# Patient Record
Sex: FEMALE | Race: WHITE
[De-identification: names, ages, dates, MRNs, and addresses within clinical notes are randomized per-mention and may not be internally consistent; named-entity substitution may affect disease eponyms.]

---

## 2018-04-04 ENCOUNTER — HOSPITAL ENCOUNTER (EMERGENCY)
Dept: HOSPITAL 72 - EMR | Age: 78
Discharge: HOME | End: 2018-04-04
Payer: MEDICARE

## 2018-04-04 VITALS — DIASTOLIC BLOOD PRESSURE: 80 MMHG | SYSTOLIC BLOOD PRESSURE: 122 MMHG

## 2018-04-04 VITALS — DIASTOLIC BLOOD PRESSURE: 77 MMHG | SYSTOLIC BLOOD PRESSURE: 138 MMHG

## 2018-04-04 VITALS — WEIGHT: 170 LBS | HEIGHT: 60 IN | BODY MASS INDEX: 33.38 KG/M2

## 2018-04-04 DIAGNOSIS — J45.909: ICD-10-CM

## 2018-04-04 DIAGNOSIS — E11.9: ICD-10-CM

## 2018-04-04 DIAGNOSIS — J11.1: Primary | ICD-10-CM

## 2018-04-04 DIAGNOSIS — K21.9: ICD-10-CM

## 2018-04-04 LAB
ADD MANUAL DIFF: NO
ALBUMIN SERPL-MCNC: 3.6 G/DL (ref 3.4–5)
ALBUMIN/GLOB SERPL: 0.9 {RATIO} (ref 1–2.7)
ALP SERPL-CCNC: 157 U/L (ref 46–116)
ALT SERPL-CCNC: 40 U/L (ref 12–78)
ANION GAP SERPL CALC-SCNC: 5 MMOL/L (ref 5–15)
APPEARANCE UR: CLEAR
APTT PPP: YELLOW S
AST SERPL-CCNC: 22 U/L (ref 15–37)
BASOPHILS NFR BLD AUTO: 0.9 % (ref 0–2)
BILIRUB SERPL-MCNC: 0.9 MG/DL (ref 0.2–1)
BUN SERPL-MCNC: 14 MG/DL (ref 7–18)
CALCIUM SERPL-MCNC: 9.3 MG/DL (ref 8.5–10.1)
CHLORIDE SERPL-SCNC: 103 MMOL/L (ref 98–107)
CO2 SERPL-SCNC: 30 MMOL/L (ref 21–32)
CREAT SERPL-MCNC: 1 MG/DL (ref 0.55–1.3)
EOSINOPHIL NFR BLD AUTO: 2.3 % (ref 0–3)
ERYTHROCYTE [DISTWIDTH] IN BLOOD BY AUTOMATED COUNT: 12.9 % (ref 11.6–14.8)
GLOBULIN SER-MCNC: 4 G/DL
GLUCOSE UR STRIP-MCNC: NEGATIVE MG/DL
HCT VFR BLD CALC: 55.4 % (ref 37–47)
HGB BLD-MCNC: 17.6 G/DL (ref 12–16)
KETONES UR QL STRIP: NEGATIVE
LEUKOCYTE ESTERASE UR QL STRIP: (no result)
LYMPHOCYTES NFR BLD AUTO: 15.6 % (ref 20–45)
MCV RBC AUTO: 93 FL (ref 80–99)
MONOCYTES NFR BLD AUTO: 6.9 % (ref 1–10)
NEUTROPHILS NFR BLD AUTO: 74.2 % (ref 45–75)
NITRITE UR QL STRIP: NEGATIVE
PH UR STRIP: 5 [PH] (ref 4.5–8)
PLATELET # BLD: 216 K/UL (ref 150–450)
POTASSIUM SERPL-SCNC: 4.3 MMOL/L (ref 3.5–5.1)
PROT UR QL STRIP: (no result)
RBC # BLD AUTO: 5.94 M/UL (ref 4.2–5.4)
SODIUM SERPL-SCNC: 138 MMOL/L (ref 136–145)
SP GR UR STRIP: 1.02 (ref 1–1.03)
UROBILINOGEN UR-MCNC: 1 MG/DL (ref 0–1)
WBC # BLD AUTO: 10 K/UL (ref 4.8–10.8)

## 2018-04-04 PROCEDURE — 83605 ASSAY OF LACTIC ACID: CPT

## 2018-04-04 PROCEDURE — 84484 ASSAY OF TROPONIN QUANT: CPT

## 2018-04-04 PROCEDURE — 87040 BLOOD CULTURE FOR BACTERIA: CPT

## 2018-04-04 PROCEDURE — 83880 ASSAY OF NATRIURETIC PEPTIDE: CPT

## 2018-04-04 PROCEDURE — 94664 DEMO&/EVAL PT USE INHALER: CPT

## 2018-04-04 PROCEDURE — 71045 X-RAY EXAM CHEST 1 VIEW: CPT

## 2018-04-04 PROCEDURE — 80053 COMPREHEN METABOLIC PANEL: CPT

## 2018-04-04 PROCEDURE — 96361 HYDRATE IV INFUSION ADD-ON: CPT

## 2018-04-04 PROCEDURE — 99284 EMERGENCY DEPT VISIT MOD MDM: CPT

## 2018-04-04 PROCEDURE — 85025 COMPLETE CBC W/AUTO DIFF WBC: CPT

## 2018-04-04 PROCEDURE — 93005 ELECTROCARDIOGRAM TRACING: CPT

## 2018-04-04 PROCEDURE — 86710 INFLUENZA VIRUS ANTIBODY: CPT

## 2018-04-04 PROCEDURE — 96374 THER/PROPH/DIAG INJ IV PUSH: CPT

## 2018-04-04 PROCEDURE — 36415 COLL VENOUS BLD VENIPUNCTURE: CPT

## 2018-04-04 PROCEDURE — 94640 AIRWAY INHALATION TREATMENT: CPT

## 2018-04-04 PROCEDURE — 81003 URINALYSIS AUTO W/O SCOPE: CPT

## 2018-04-04 NOTE — DIAGNOSTIC IMAGING REPORT
Indication: Cough

 

Comparison:  11/11/2016

 

A single view chest radiograph was obtained.

 

Findings:

 

Cardiomediastinal appearance is within normal limits for age.  Pulmonary vascularity

is appropriate. The diaphragmatic contour is smooth and costophrenic angles are

sharp. No pleural effusions are identified. The bones are unremarkable.

 

Impression: No acute findings

## 2018-04-04 NOTE — EMERGENCY ROOM REPORT
History of Present Illness


General


Chief Complaint:  General Complaint


Source:  Patient





Present Illness


HPI


78-year-old female presents ED for evaluation.  Plating of body aches and cough 

and chills 3 days.  Feeling weak.  Afebrile.  Denies chest pain.History of 

asthma.  States cough is productive with yellowish phlegm.  States daughter 

also has similar symptoms and is also here for evaluation.  Did not receive flu 

vaccine this year.  No other aggravating relieving factors.  Denies any other 

associated symptoms


Allergies:  


Coded Allergies:  


     Cultivated Oat Pollen (Verified  Allergy, Unknown, 4/4/18)





Patient History


Past Medical History:  DM, asthma, GERD


Past Surgical History:  none


Pertinent Family History:  none


Social History:  Denies: smoking, alcohol use, drug use


Last Menstrual Period:  n/a


Pregnant Now:  No


Immunizations:  UTD


Reviewed Nursing Documentation:  PMH: Agreed; PSxH: Agreed





Nursing Documentation-PMH


Hx Hypertension:  No


Hx Asthma:  Yes


Hx Diabetes:  Yes - DM II


Hx Gastrointestinal Problems:  Yes - Acid Reflux





Review of Systems


All Other Systems:  negative except mentioned in HPI





Physical Exam





Vital Signs








  Date Time  Temp Pulse Resp B/P (MAP) Pulse Ox O2 Delivery O2 Flow Rate FiO2


 


4/4/18 11:39 98.2 83 16 177/89 94 Room Air  





 98.2       








Sp02 EP Interpretation:  reviewed, normal


General Appearance:  no apparent distress, alert, GCS 15, non-toxic


Head:  normocephalic, atraumatic


Eyes:  bilateral eye normal inspection, bilateral eye PERRL


ENT:  hearing grossly normal, normal pharynx, no angioedema, normal voice


Neck:  full range of motion, supple/symm/no masses


Respiratory:  chest non-tender, normal breath sounds, speaking full sentences, 

wheezing


Cardiovascular #1:  regular rate, rhythm, no edema


Cardiovascular #2:  2+ carotid (R), 2+ carotid (L), 2+ radial (R), 2+ radial (L)

, 2+ dorsalis pedis (R), 2+ dorsalis pedis (L)


Gastrointestinal:  normal bowel sounds, non tender, soft, non-distended, no 

guarding, no rebound


Rectal:  deferred


Genitourinary:  normal inspection, no CVA tenderness


Musculoskeletal:  back normal, gait/station normal, normal range of motion, non-

tender


Neurologic:  alert, oriented x3, responsive, motor strength/tone normal, 

sensory intact, speech normal


Psychiatric:  judgement/insight normal, memory normal, mood/affect normal, no 

suicidal/homicidal ideation


Reflexes:  3+ bicep (R), 3+ bicep (L), 3+ tricep (R), 3+ tricep (L), 3+ knee (R)

, 3+ knee (L)


Skin:  normal color, no rash, warm/dry, well hydrated


Lymphatic:  no adenopathy





Medical Decision Making


Diagnostic Impression:  


 Primary Impression:  


 Flu-like symptoms


ER Course


Hospital Course 


78-year-old female presents to ED complaining of bodyaches, cough, weakness





Differential diagnoses include: URI, bronchitis, asthma/COPD, pneumonia 





Clinical course


Patient placed on stretcher.  After initial history, physical exam reveals an 

elderly female in no acute distress.  Bilateral TM unremarkable.  No pharyngeal 

erythema.  No tonsillar exudates.  No lymphadenopathy.  +wheezing





I ordered labs, IV fluids, nebs, chest x-ray.





Labs reviewed-no leukocytosis, hemoglobin/hematocrit stable, electrolytes okay, 

lactate ok


EKG - NSR, no acute ischemic changes interpreted by me


Chest x-ray no acute iinfiltrate





Per curb-65 criteria, patient does not require admission.  Patient can be 

safely discharged to home with outpatient therapy.  Patient agrees with plan.  





Diagnosis - flu like symptoms





Stable and discharged home with prescriptions for amoxicillin, tamiflu, 

promethazine, albtuerol, prednisone.  Instructed to followup with PMD.  Return 

to ED if symptoms recur or worsen





Labs








Test


  4/4/18


12:00 4/4/18


12:15


 


Urine Color Yellow  


 


Urine Appearance Clear  


 


Urine pH 5 (4.5-8.0)  


 


Urine Specific Gravity


  1.020


(1.005-1.035) 


 


 


Urine Protein 1+ (NEGATIVE)  


 


Urine Glucose (UA)


  Negative


(NEGATIVE) 


 


 


Urine Ketones


  Negative


(NEGATIVE) 


 


 


Urine Occult Blood 2+ (NEGATIVE)  


 


Urine Nitrite


  Negative


(NEGATIVE) 


 


 


Urine Bilirubin


  Negative


(NEGATIVE) 


 


 


Urine Urobilinogen


  1 MG/DL


(0.0-1.0) 


 


 


Urine Leukocyte Esterase 1+ (NEGATIVE)  


 


Urine RBC


  2-4 /HPF (0 -


2) 


 


 


Urine WBC


  0-2 /HPF (0 -


2) 


 


 


Urine Squamous Epithelial


Cells Moderate /LPF


(NONE/OCC) 


 


 


Urine Bacteria


  Few /HPF


(NONE) 


 


 


Urine Mucus


  Few /LPF


(NONE/OCC) 


 


 


White Blood Count


  


  10.0 K/UL


(4.8-10.8)


 


Red Blood Count


  


  5.94 M/UL


(4.20-5.40)


 


Hemoglobin


  


  17.6 G/DL


(12.0-16.0)


 


Hematocrit


  


  55.4 %


(37.0-47.0)


 


Mean Corpuscular Volume  93 FL (80-99) 


 


Mean Corpuscular Hemoglobin


  


  29.6 PG


(27.0-31.0)


 


Mean Corpuscular Hemoglobin


Concent 


  31.8 G/DL


(32.0-36.0)


 


Red Cell Distribution Width


  


  12.9 %


(11.6-14.8)


 


Platelet Count


  


  216 K/UL


(150-450)


 


Mean Platelet Volume


  


  6.9 FL


(6.5-10.1)


 


Neutrophils (%) (Auto)


  


  74.2 %


(45.0-75.0)


 


Lymphocytes (%) (Auto)


  


  15.6 %


(20.0-45.0)


 


Monocytes (%) (Auto)


  


  6.9 %


(1.0-10.0)


 


Eosinophils (%) (Auto)


  


  2.3 %


(0.0-3.0)


 


Basophils (%) (Auto)


  


  0.9 %


(0.0-2.0)


 


Sodium Level


  


  138 MMOL/L


(136-145)


 


Potassium Level


  


  4.3 MMOL/L


(3.5-5.1)


 


Chloride Level


  


  103 MMOL/L


()


 


Carbon Dioxide Level


  


  30 MMOL/L


(21-32)


 


Anion Gap


  


  5 mmol/L


(5-15)


 


Blood Urea Nitrogen


  


  14 mg/dL


(7-18)


 


Creatinine


  


  1.0 MG/DL


(0.55-1.30)


 


Estimat Glomerular Filtration


Rate 


   mL/min (>60) 


 


 


Glucose Level


  


  97 MG/DL


()


 


Lactic Acid Level


  


  1.90 mmol/L


(0.66-2.22)


 


Calcium Level


  


  9.3 MG/DL


(8.5-10.1)


 


Total Bilirubin


  


  0.9 MG/DL


(0.2-1.0)


 


Aspartate Amino Transf


(AST/SGOT) 


  22 U/L (15-37) 


 


 


Alanine Aminotransferase


(ALT/SGPT) 


  40 U/L (12-78) 


 


 


Alkaline Phosphatase


  


  157 U/L


()


 


Troponin I


  


  0.000 ng/mL


(0.000-0.056)


 


Pro-B-Type Natriuretic Peptide


  


  44 pg/mL


(0-125)


 


Total Protein


  


  7.6 G/DL


(6.4-8.2)


 


Albumin


  


  3.6 G/DL


(3.4-5.0)


 


Globulin  4.0 g/dL 


 


Albumin/Globulin Ratio  0.9 (1.0-2.7) 








EKG Diagnostic Results


Rate:  normal


Rhythm:  NSR


ST Segments:  no acute changes


ASA given to the pt in ED:  No





Rhythm Strip Diag. Results


EP Interpretation:  yes


Rhythm:  NSR, no PVC's, no ectopy





Chest X-Ray Diagnostic Results


Chest X-Ray Diagnostic Results :  


   Chest X-Ray Ordered:  Yes


   # of Views/Limited/Complete:  1 View


   Indication:  Shortness of Breath


   EP Interpretation:  Yes


   Interpretation:  no consolidation, no effusion, no pneumothorax, no acute 

cardiopulmonary disease


   Impression:  No acute disease


   Electronically Signed by:  Electronically signed by Ashutosh Ryan MD





Last Vital Signs








  Date Time  Temp Pulse Resp B/P (MAP) Pulse Ox O2 Delivery O2 Flow Rate FiO2


 


4/4/18 13:55 98.1 92 24 122/80 95 Room Air  





 98.3       








Status:  improved


Disposition:  HOME, SELF-CARE


Condition:  Stable


Scripts


Amoxicillin* (AMOXIL*) 500 Mg Capsule


500 MG ORAL THREE TIMES A DAY, #21 CAP


   Prov: Ashutosh Ryan MD         4/4/18 


Promethazine Hcl (PROMETHAZINE HCL*) 6.25 Mg/5 Ml Syrup


5 ML ORAL Q8H, #120 ML 0 Refills


   Prov: Ashutosh Ryan MD         4/4/18 


Oseltamivir Phosphate (Tamiflu) 75 Mg Capsule


75 MG ORAL TWICE A DAY for 5 Days, CAP


   Prov: Ashutosh Ryan MD         4/4/18 


Prednisone* (PREDNISONE*) 20 Mg Tablet


40 MG ORAL DAILY, #10 TAB


   Prov: Ashutosh Ryan MD         4/4/18 


Albuterol Sulfate* (ALBUTEROL SULFATE MDI*) 8.5 Gm Hfa.aer.ad


2 PUFF INH Q6H, #1 EA 0 Refills


   Prov: Ashutosh Ryan MD         4/4/18


Patient Instructions:  Influenza, Adult, Easy-to-Read











Ashutosh Ryan MD Apr 4, 2018 15:18

## 2018-04-05 NOTE — CARDIOLOGY REPORT
--------------- APPROVED REPORT --------------





EKG Measurement

Heart [id number]

[id number]

[id number]

[id number]

[id number]





Normal sinus rhythm

Cannot rule out Anterior infarct, age undetermined

Abnormal ECG